# Patient Record
(demographics unavailable — no encounter records)

---

## 2024-12-30 NOTE — REVIEW OF SYSTEMS
[Negative] : Heme/Lymph [Joint Pain] : no joint pain [Joint Stiffness] : no joint stiffness [Joint Swelling] : no joint swelling [[email or web address]] : right hip

## 2024-12-30 NOTE — HISTORY OF PRESENT ILLNESS
[Pain Location] : pain [0] : a current pain level of 0/10 [de-identified] : This is a 64-year-old male status post right total hip arthroplasty on November 10, 2022  He is status post left total knee arthroplasty on January 10, 2019  He is doing well from both joint that is replaced.  He denies any pain he denies buckling locking no fever no chills no stiffness no swelling. Patient also concerned about status of the right knee arthritis although patient denies much pain and it. He is ambulating without walking assistive device .  Denies any limitation with activities

## 2024-12-30 NOTE — DISCUSSION/SUMMARY
[de-identified] : This is a 64-year-old male status post right total hip arthroplasty on November 10, 2022 .   He is status post left total knee arthroplasty on January 10, 2019 Patient has mild to moderate osteoarthritis right knee  that is asymptomatic  Patient is 2 years from the hip replacement 5 years from the left knee replacement.  His implants are stable he is examination is free from infection or mechanical failure.  Patient will stay with low impact exercise.  I advised him to return to office in 5 years for x-ray surveillance.  He may discontinue prophylactic antibiotic prior to dental work.

## 2024-12-30 NOTE — PHYSICAL EXAM
[Antalgic] : not antalgic [de-identified] : GENERAL APPEARANCE: Well nourished and hydrated, pleasant, alert, and oriented x 3. Appears their stated age.  HEENT: Normocephalic, extraocular eye motion intact. Nasal septum midline. Oral cavity clear. External auditory canal clear.  RESPIRATORY: Breath sounds clear and audible in all lobes. No wheezing, No accessory muscle use. CARDIOVASCULAR: No apparent abnormalities. No lower leg edema. No varicosities. Pedal pulses are palpable. NEUROLOGIC: Sensation is normal, no muscle weakness in the upper or lower extremities. DERMATOLOGIC: No apparent skin lesions, moist, warm, no rash. SPINE: Cervical spine appears normal and moves freely; thoracic spine appears normal and moves freely; lumbosacral spine appears normal and moves freely, normal, nontender. MUSCULOSKELETAL: Hands, wrists, and elbows are normal and move freely, shoulders are normal and move freely.  Musculoskeletal 5/5 motor strength in bilateral lower extremities. Sensory: Intact in bilateral lower extremities. DTRs: Biceps, brachioradialis, triceps, patellar, ankle and plantar 2+ and symmetric bilaterally. Pulses: dorsalis pedis, posterior tibial, femoral, popliteal, and radial 2+ and symmetric bilaterally.  Constitutional: Alert and in no acute distress, but well-appearing.   [de-identified] : Right hip examination shows healed incision.  No Trendelenburg gait no erythema no swelling Left knee examination shows healed incision no joint effusion range of motion is 0 to 125 degree Right knee examination shows painless range of motion is 0 to 135 degree.  No joint effusion [de-identified] :  3V xray of the right hip done in the office today and reviewed and demonstrates s/p implants in good positioning with no evidence of wear, loosening, or subsidence.    3V xray of the left knee done in the office today and reviewed and demonstrates s/p implants in good positioning with no evidence of wear, loosening, or subsidence.   4 view of right knee x-ray shows mild to moderate osteoarthritis with preserved joint space no significant osteophytes